# Patient Record
Sex: FEMALE
[De-identification: names, ages, dates, MRNs, and addresses within clinical notes are randomized per-mention and may not be internally consistent; named-entity substitution may affect disease eponyms.]

---

## 2023-01-31 PROBLEM — Z00.00 ENCOUNTER FOR PREVENTIVE HEALTH EXAMINATION: Status: ACTIVE | Noted: 2023-01-31

## 2023-02-02 ENCOUNTER — APPOINTMENT (OUTPATIENT)
Dept: ORTHOPEDIC SURGERY | Facility: CLINIC | Age: 83
End: 2023-02-02

## 2023-03-15 ENCOUNTER — EMERGENCY (EMERGENCY)
Facility: HOSPITAL | Age: 83
LOS: 1 days | Discharge: HOME | End: 2023-03-15
Attending: STUDENT IN AN ORGANIZED HEALTH CARE EDUCATION/TRAINING PROGRAM | Admitting: STUDENT IN AN ORGANIZED HEALTH CARE EDUCATION/TRAINING PROGRAM
Payer: MEDICARE

## 2023-03-15 VITALS
TEMPERATURE: 98 F | OXYGEN SATURATION: 99 % | WEIGHT: 220.02 LBS | DIASTOLIC BLOOD PRESSURE: 75 MMHG | HEART RATE: 90 BPM | RESPIRATION RATE: 19 BRPM | SYSTOLIC BLOOD PRESSURE: 155 MMHG

## 2023-03-15 PROCEDURE — 82962 GLUCOSE BLOOD TEST: CPT

## 2023-03-15 PROCEDURE — 99284 EMERGENCY DEPT VISIT MOD MDM: CPT

## 2023-03-15 PROCEDURE — 99283 EMERGENCY DEPT VISIT LOW MDM: CPT

## 2023-03-15 NOTE — ED PROVIDER NOTE - PATIENT PORTAL LINK FT
You can access the FollowMyHealth Patient Portal offered by [facility] by registering at the following website: [email or web address]. By joining [facility]’s FollowMyHealth portal, you will also be able to view your health information using other applications (apps) compatible with our system.

## 2023-03-15 NOTE — ED PROVIDER NOTE - NS ED ROS FT
Constitutional: No fever or chills  Eyes: No discharge or drainage  Ears, Nose, Mouth, Throat: No nasal discharge, no sore throat  Cardiovascular: No chest pain, no palpitations  Respiratory: No shortness of breath, no cough  Gastrointestinal: No nausea or vomiting, no abdominal pain, no diarrhea or constipation  Musculoskeletal: No joint pain, no swelling  Skin: No rashes or lesions  Neurological: No numbness, weakness, tingling, no headache

## 2023-03-15 NOTE — ED PROVIDER NOTE - PHYSICAL EXAMINATION
General: Well appearing, in no acute distress  HEENT: Normocephalic, atraumatic, extraocular movements intact  CV: Regular rate  Pulm: No respiratory distress, no tachypnea  Abd: Flat, no gross distension, soft, nontender  Ext: warm and well perfused  Skin: No gross rashes or lesions  Neuro: Alert and oriented, moving all extremities

## 2023-03-15 NOTE — ED ADULT NURSE NOTE - CHIEF COMPLAINT QUOTE
BIBEMS from [facility] nursing home co anxiety. Pt vocalizing displeasure with nursing home and would like to go some place else. Denies SI, HI, acute complaints.

## 2023-03-15 NOTE — ED ADULT TRIAGE NOTE - CHIEF COMPLAINT QUOTE
BIBEMS from [facility] nursing home co anxiety. Pt vocalizing displeasure with nursing home and would like to go some place else. Denies SI, HI, acute complaints.

## 2023-03-15 NOTE — ED PROVIDER NOTE - NSFOLLOWUPINSTRUCTIONS_ED_ALL_ED_FT
You are leaving the hospital AGAINST MEDICAL ADVICE. Please return for any worsening symptoms. Please follow up closely with your primary physician. Please return for any worsening symptoms. Please follow up closely with your primary physician.

## 2023-03-15 NOTE — ED PROVIDER NOTE - PROGRESS NOTE DETAILS
SW spoke to [facility] regarding patient's request to leave there and have home services established for patient through [facility]. [facility] verbalized understanding, patient given phone numbers of various people at [facility] to coordinate discharge from facility. Patient now states she refuses to go back there and would like to go home. Patient understands that she is at risk for falls at home and it would not be a safe discharge. Patient states she will be careful, has elevator in building, no stairs necessary and would like to leave. Understands that she may fall, hit her head, have fatal trauma. Patient states she would like to leave regardless, she is fully alert and oriented. Patient will sign out AGAINST MEDICAL ADVICE. Patient now states she is willing to go back to [facility], patient given phone numbers at [facility] so she can safely leave there. Will discharge.

## 2023-03-15 NOTE — ED PROVIDER NOTE - CLINICAL SUMMARY MEDICAL DECISION MAKING FREE TEXT BOX
83 yo F presenting from [facility] stating she is unhappy at facility and does not want to go back. No acute complaints. Exam nonfocal, [id number], otherwise well-appearing. Pt with frequent falls, not safe discharge home, states she is adamant about going home. Discussed with patient re: calling facility, as facility can arrange outpatient services for patient to ensure safe discharge. Patient amenable, will reassess.

## 2023-03-15 NOTE — ED PROVIDER NOTE - OBJECTIVE STATEMENT
82-year-old female with history of hypertension, dementia, OA, recurrent falls, sent from [facility] as patient unhappy with care she is receiving there.  Patient has had multiple falls, went to an outside hospital and had negative x-rays but MRI with medial meniscal tear, treated for pain and transferred to [facility] for subacute rehab.  Patient without any acute complaints, no medical complaints, states that she is just unhappy at her facility and does not want to be there, would rather be home with home services.  ROS as above.

## 2023-03-17 DIAGNOSIS — Z00.00 ENCOUNTER FOR GENERAL ADULT MEDICAL EXAMINATION WITHOUT ABNORMAL FINDINGS: ICD-10-CM

## 2023-03-17 DIAGNOSIS — I10 ESSENTIAL (PRIMARY) HYPERTENSION: ICD-10-CM

## 2023-03-17 DIAGNOSIS — F41.9 ANXIETY DISORDER, UNSPECIFIED: ICD-10-CM

## 2023-03-17 DIAGNOSIS — M19.90 UNSPECIFIED OSTEOARTHRITIS, UNSPECIFIED SITE: ICD-10-CM

## 2023-03-17 DIAGNOSIS — F03.90 UNSPECIFIED DEMENTIA WITHOUT BEHAVIORAL DISTURBANCE: ICD-10-CM

## 2023-03-17 DIAGNOSIS — R29.6 REPEATED FALLS: ICD-10-CM
